# Patient Record
Sex: FEMALE | Race: WHITE | NOT HISPANIC OR LATINO | Employment: OTHER | URBAN - METROPOLITAN AREA
[De-identification: names, ages, dates, MRNs, and addresses within clinical notes are randomized per-mention and may not be internally consistent; named-entity substitution may affect disease eponyms.]

---

## 2018-07-23 ENCOUNTER — OFFICE VISIT (OUTPATIENT)
Dept: SPORTS MEDICINE | Facility: CLINIC | Age: 48
End: 2018-07-23

## 2018-07-23 VITALS
DIASTOLIC BLOOD PRESSURE: 84 MMHG | BODY MASS INDEX: 24.59 KG/M2 | SYSTOLIC BLOOD PRESSURE: 116 MMHG | WEIGHT: 147.6 LBS | HEIGHT: 65 IN

## 2018-07-23 DIAGNOSIS — M25.512 CHRONIC LEFT SHOULDER PAIN: ICD-10-CM

## 2018-07-23 DIAGNOSIS — M75.52 SUBACROMIAL BURSITIS OF LEFT SHOULDER JOINT: Primary | ICD-10-CM

## 2018-07-23 DIAGNOSIS — G89.29 CHRONIC LEFT SHOULDER PAIN: ICD-10-CM

## 2018-07-23 PROCEDURE — 99214 OFFICE O/P EST MOD 30 MIN: CPT | Performed by: FAMILY MEDICINE

## 2018-07-23 PROCEDURE — 20610 DRAIN/INJ JOINT/BURSA W/O US: CPT | Performed by: FAMILY MEDICINE

## 2018-07-23 RX ORDER — TRIAMCINOLONE ACETONIDE 40 MG/ML
80 INJECTION, SUSPENSION INTRA-ARTICULAR; INTRAMUSCULAR ONCE
Status: COMPLETED | OUTPATIENT
Start: 2018-07-23 | End: 2018-07-23

## 2018-07-23 RX ADMIN — TRIAMCINOLONE ACETONIDE 80 MG: 40 INJECTION, SUSPENSION INTRA-ARTICULAR; INTRAMUSCULAR at 12:37

## 2018-07-23 NOTE — PROGRESS NOTES
"Ileana is a 47 y.o. year old female    Chief Complaint   Patient presents with   • Left Shoulder - Pain, Follow-up     Prev x-ray UC, injured shoulder 2 month ago.        History of Present Illness  HPI     L shoulder pain: x2 mo. Fell in muddy hole while walking in neighborhood and landed directly on it. Went to outside UC, XR performed. Has tried Tylenol. Persistent pain, liza at night when rolls over on it. Pain with full ROM. RHD. Radiating down to mid bicep. Requests inj.    I have reviewed the patient's medical, family, and social history in detail and updated the computerized patient record.    Review of Systems   Constitutional: Negative for fever.   Musculoskeletal:        Per HPI   Skin: Negative for rash.   Neurological: Negative for weakness and numbness.   Psychiatric/Behavioral: Negative for sleep disturbance.   All other systems reviewed and are negative.      /84   Ht 165.1 cm (65\")   Wt 67 kg (147 lb 9.6 oz)   BMI 24.56 kg/m²      Physical Exam    Vital signs reviewed.   General: No acute distress.  Eyes: conjunctiva clear; pupils equally round and reactive  ENT: external ears and nose atraumatic; oropharynx clear  CV: no peripheral edema, 2+ distal pulses  Resp: normal respiratory effort, no use of accessory muscles  Skin: no rashes or wounds; normal turgor  Psych: mood and affect appropriate; recent and remote memory intact  Neuro: sensation to light touch intact    MSK Exam:  Ortho Exam    C-spine: full ROM; no tenderness; negative Spurling's maneuver b/l; 5/5 shoulder abduction,  strength; 2+ DTRs C5, C6 b/l  R shoulder: no tenderness or warmth; full ROM; negative Girard's; negative empty can; negative liftoff; - Woodard; - scarf  L shoulder: no tenderness or warmth; full ROM; negative Girard's; negative empty can; negative liftoff; + Woodard; + scarf      5/1/18 XR L shoulder 2 view - [facility]  IMPRESSION:     1. Normal left shoulder.    Shoulder Injection Procedure Note    Left " "shoulder subacromial bursa injection was discussed with the patient in detail, including indication, risks, benefits, and alternatives. Verbal consent was given for the procedure. Injection was performed by physician.  Injection site was identified by physical examination and cleaned with Betadine and alcohol swabs. Prior to needle insertion, ethyl chloride spray was used for surface anesthesia. Sterile technique was used.  A 25-gauge, 1.5\" needle was directed to the joint from a(n) posterior approach. Injectate was passed into the subacromial bursa without difficulty. The needle was removed and a simple bandage was applied. The procedure was tolerated well without difficulty.    Injection mixture:  1% lidocaine without epinephrine: 4 mL  40 mg/mL triamcinolone acetonide: 2 mL      Diagnoses and all orders for this visit:    Subacromial bursitis of left shoulder joint  -     Ambulatory Referral to Physical Therapy Evaluate and treat  -     triamcinolone acetonide (KENALOG-40) injection 80 mg; Inject 2 mL into the joint 1 (One) Time.    Chronic left shoulder pain  -     triamcinolone acetonide (KENALOG-40) injection 80 mg; Inject 2 mL into the joint 1 (One) Time.      Do not appreciate full RTC tear based on exam. Tolerated inj well. rec PT. F/up 6 wks.    EMR Dragon/Transcription disclaimer:    Much of this encounter note is an electronic transcription/translation of spoken language to printed text.  The electronic translation of spoken language may permit erroneous, or at times, nonsensical words or phrases to be inadvertently transcribed.  Although I have reviewed the note for such errors some may still exist.    "

## 2018-07-26 ENCOUNTER — TREATMENT (OUTPATIENT)
Dept: PHYSICAL THERAPY | Facility: CLINIC | Age: 48
End: 2018-07-26

## 2018-07-26 DIAGNOSIS — M75.52 SUBACROMIAL BURSITIS OF LEFT SHOULDER JOINT: Primary | ICD-10-CM

## 2018-07-26 DIAGNOSIS — M75.02 ADHESIVE CAPSULITIS OF LEFT SHOULDER: ICD-10-CM

## 2018-07-26 DIAGNOSIS — M25.512 LEFT SHOULDER PAIN, UNSPECIFIED CHRONICITY: ICD-10-CM

## 2018-07-26 PROCEDURE — 97014 ELECTRIC STIMULATION THERAPY: CPT | Performed by: PHYSICAL THERAPIST

## 2018-07-26 PROCEDURE — 97161 PT EVAL LOW COMPLEX 20 MIN: CPT | Performed by: PHYSICAL THERAPIST

## 2018-07-26 PROCEDURE — 97110 THERAPEUTIC EXERCISES: CPT | Performed by: PHYSICAL THERAPIST

## 2018-07-26 NOTE — PROGRESS NOTES
Physical Therapy Initial Evaluation and Plan of Care    Patient: Ileana Martin   : 1970  Diagnosis/ICD-10 Code:  Subacromial bursitis of left shoulder joint [M75.52]  Referring practitioner: Ciaran Ruiz *    Subjective Evaluation    History of Present Illness  Mechanism of injury: Fell in muddy hole while walking in neighborhood and landed directly on it in May. Couldn't move my arm so went to outside .     Got an injection on Monday and no significant improvement.      PMH - HNP cervical/lumbar - sees a chiropractor. Had recent eye surgery - retina    Quick DASH 25% perceived disability      Patient Occupation: self employed - owns rental property Pain  Current pain ratin  At best pain ratin  At worst pain ratin  Location: R anterior/lateral arm below shoulder joint.   Quality: radiating, burning and dull ache  Aggravating factors: sleeping, outstretched reach and overhead activity (behind the back)  Progression: worsening    Hand dominance: right    Diagnostic Tests  X-ray: normal    Treatments  Previous treatment: injection treatment           Objective       Tenderness     Left Shoulder   Tenderness in the subacromial bursa. No tenderness in the AC joint, acromion and clavicle.     Active Range of Motion   Left Shoulder   Flexion: 130 degrees with pain  Abduction: 96 (exquisite) degrees with pain  External rotation 45°: 27 degrees with pain  Internal rotation 45°: 62 degrees   Internal rotation BTB: Active internal rotation behind the back: L buttock. with pain    Right Shoulder   Internal rotation BTB: T8     Passive Range of Motion   Left Shoulder   Abduction: 96 degrees with pain    Joint Play   Left Shoulder  Hypomobile in the anterior capsule, posterior capsule and inferior capsule.    Strength/Myotome Testing     Left Shoulder     Planes of Motion   Flexion: 4+ (pain)   Abduction: 4+ (pain)   External rotation at 0°: 5   Internal rotation at 0°: 5     Tests     Left  Shoulder   Positive active compression ([place]), Hawkin's and passive horizontal adduction.   Negative drop arm and empty can.          Assessment & Plan     Assessment  Impairments: abnormal or restricted ROM, activity intolerance, impaired physical strength and pain with function  Assessment details:  Ileana Martin is a pleasant 47 y.o. female that presents presents with signs and symptoms consistent with post traumatic adhesive capsulitis/impingement/SA bursitis. She presents with jt hypomobility/decreased A/PROM, palp tenderness, + impingement/labral signs. Pt would benefit from skilled PT services in order to address listed impairments and increase tolerance to normal daily activities including ADLs, work and recreational activities.       Prognosis: good  Functional Limitations: carrying objects, lifting, sleeping, pushing, uncomfortable because of pain, reaching behind back and reaching overhead  Goals  Plan Goals: STG In 2 weeks  1. Pt to exhibit compliance/independence with HEP.  2. Pt to exhibit improved IR to at least L4 to allow for greater ease with behind the back movements.    3.  Improved sleep tolerance  4.  Pt to report improved tolerance to reaching activities    LTG In 4-6 weeks  1. L shoulder flex/abd 5/5 and non-painful to allow for push/pull and lifting activities.  2. Pain not > than 3/10 with ADLs  3. Pt no longer exhibiting + impingement signs to allow for tolerance to OH activities.  4. Quick DASH < 15%  5. AROM WNL and non painful to allow for full return to reaching activities as needed for personal care/ADLs      Plan  Therapy options: will be seen for skilled physical therapy services  Planned modality interventions: ultrasound, electrical stimulation/Russian stimulation and cryotherapy  Planned therapy interventions: manual therapy, joint mobilization, neuromuscular re-education, strengthening, stretching and home exercise program  Frequency: 2x week  Duration in weeks:  6  Treatment plan discussed with: patient        Manual Therapy:    10     mins  32436;  Therapeutic Exercise:    10     mins  36248;     Neuromuscular Jayson:    -    mins  12592;    Therapeutic Activity:     -     mins  44650;     Gait Training:      -     mins  96543;     Ultrasound:     -     mins  09587;    Electrical Stimulation:    15     mins  45165 ( );  Iontophoresis                 -     mins 46257      Timed Treatment:   20   mins   Total Treatment:     60   mins    PT SIGNATURE: JAMAL Heck License # [id number]  DATE TREATMENT INITIATED: 7/26/2018    Initial Certification  Certification Period: 10/24/2018  I certify that the therapy services are furnished while this patient is under my care.  The services outlined above are required by this patient, and will be reviewed every 90 days.     PHYSICIAN: Ciaran Ruiz Jr., DO      DATE:     Please sign and return via fax to [phone].. Thank you, [facility].

## 2018-07-26 NOTE — PATIENT INSTRUCTIONS
Access Code: [email or web address]   URL: [email or web address]   Date: 07/26/2018   Prepared by: Rosie Dubose     Exercises   Supine Shoulder External Rotation with Dowel - 10 reps - 1 sets - 5 hold - 2x daily   Supine Shoulder Flexion with Dowel AAROM - Palms Up - 10 reps - 1 sets - 5 hold - 2x daily   Seated Shoulder Inferior Glide - 4 reps - 1 sets - 20 hold - 2x daily   Circular Shoulder Pendulum with Table Support - 25 reps - 2 sets - 2x daily     Patient was educated on findings of evaluation, purpose of treatment, and goals for therapy.  Treatment options discussed and questions answered.  Patient was educated on exercises/self treatment/pain relief techniques.

## 2018-08-07 ENCOUNTER — TREATMENT (OUTPATIENT)
Dept: PHYSICAL THERAPY | Facility: CLINIC | Age: 48
End: 2018-08-07

## 2018-08-07 ENCOUNTER — OFFICE VISIT (OUTPATIENT)
Dept: ORTHOPEDIC SURGERY | Facility: CLINIC | Age: 48
End: 2018-08-07

## 2018-08-07 VITALS — HEIGHT: 65 IN | WEIGHT: 148.2 LBS | TEMPERATURE: 97.5 F | BODY MASS INDEX: 24.69 KG/M2

## 2018-08-07 DIAGNOSIS — M25.512 LEFT SHOULDER PAIN, UNSPECIFIED CHRONICITY: ICD-10-CM

## 2018-08-07 DIAGNOSIS — M75.52 SUBACROMIAL BURSITIS OF LEFT SHOULDER JOINT: Primary | ICD-10-CM

## 2018-08-07 DIAGNOSIS — M75.02 ADHESIVE CAPSULITIS OF LEFT SHOULDER: ICD-10-CM

## 2018-08-07 DIAGNOSIS — M75.02 ADHESIVE CAPSULITIS OF LEFT SHOULDER: Primary | ICD-10-CM

## 2018-08-07 PROCEDURE — 20610 DRAIN/INJ JOINT/BURSA W/O US: CPT | Performed by: ORTHOPAEDIC SURGERY

## 2018-08-07 PROCEDURE — 97110 THERAPEUTIC EXERCISES: CPT | Performed by: PHYSICAL THERAPIST

## 2018-08-07 PROCEDURE — 99204 OFFICE O/P NEW MOD 45 MIN: CPT | Performed by: ORTHOPAEDIC SURGERY

## 2018-08-07 RX ADMIN — LIDOCAINE HYDROCHLORIDE 4 ML: 10 INJECTION, SOLUTION INFILTRATION; PERINEURAL at 15:51

## 2018-08-07 RX ADMIN — METHYLPREDNISOLONE ACETATE 80 MG: 80 INJECTION, SUSPENSION INTRA-ARTICULAR; INTRALESIONAL; INTRAMUSCULAR; SOFT TISSUE at 15:51

## 2018-08-07 NOTE — PROGRESS NOTES
Physical Therapy Daily Progress Note  Visit: 2    Ileana aMrtin reports: I am feeling a little better in the (L) shdr.  The HEP is going well.     Subjective     Objective   See Exercise, Manual, and Modality Logs for complete treatment.       Assessment & Plan     Assessment  Assessment details: The pt demos (I) w/ HEP, but needed some VC's for shdr ER exercise.  Pt og the progression of exercises without issue.       Plan  Plan details: Progress ROM / strengthening /stabilization / functional activity as tolerated                   Manual Therapy:         mins  45618;  Therapeutic Exercise:    30     mins  32713;     Neuromuscular Jayson:        mins  14358;    Therapeutic Activity:          mins  80422;     Gait Training:           mins  70860;     Ultrasound:          mins  50818;    Electrical Stimulation:         mins  52600 ( );  Dry Needling          mins self-pay    Timed Treatment:   30   mins   Total Treatment:     30   mins    Dom Gong PT  [place] Lic. # [id number]  Physical Therapist

## 2018-08-07 NOTE — PROGRESS NOTES
Left  New Shoulder      Patient: Ileana Martin        YOB: 1970    Medical Record Number: [id number]        Chief Complaints: left shoulder pain  Chief Complaint   Patient presents with   • Left Shoulder - Establish Care, Pain           History of Present Illness: This is a  47 y.o. 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 Ruiz recently who did a cortisone injection the subacromial space she is also start some physical therapy his had no real improvement or symptoms of back it's a bit worse.  Symptoms are moderate intermittent stabbing aching past medical history is remarkable for anxiety          Allergies:   Allergies   Allergen Reactions   • Aspirin        Medications:   Home Medications:  Current Outpatient Prescriptions on File Prior to Visit   Medication Sig   • ALPRAZolam (XANAX) 0.25 MG tablet    • cyclobenzaprine (FLEXERIL) 10 MG tablet    • naproxen sodium (ALEVE) 220 MG tablet Take 220 mg by mouth 2 (two) times a day as needed for mild pain (1-3).   • nitrofurantoin, macrocrystal-monohydrate, (MACROBID) 100 MG capsule      No current facility-administered medications on file prior to visit.      Current Medications:  Scheduled Meds:  Continuous Infusions:  No current facility-administered medications for this visit.   PRN Meds:.    Past Medical History:   Diagnosis Date   • Anxiety    • Injury of back         Past Surgical History:   Procedure Laterality Date   •  SECTION          Social History     Occupational History   • Not on file.     Social History Main Topics   • Smoking status: Never Smoker   • Smokeless tobacco: Never Used   • Alcohol use No   • Drug use: No   • Sexual activity: Defer    Social History     Social History Narrative   • No narrative on  "file        Family History   Problem Relation Age of Onset   • Diabetes Mother    • Heart disease Mother    • Heart attack Mother    • Hypertension Mother    • No Known Problems Sister    • Heart attack Father    • Heart disease Father    • Hypertension Father    • Diabetes Father              Review of Systems: 14 point review of systems are remarkable only for the shoulder pain the remainder are negative per the patient    Review of Systems      Physical Exam: 47 y.o. female  General Appearance:    Alert, cooperative, in no acute distress                 Vitals:    08/07/18 1505   Temp: 97.5 °F (36.4 °C)   TempSrc: Temporal Artery    Weight: 67.2 kg (148 lb 3.2 oz)   Height: 165.1 cm (65\")      Patient is alert and read ×3 no acute distress appears her above-listed at height weight and age.  Affect is normal respiratory rate is normal unlabored. Heart rate regular rate rhythm, sclera, dentition and hearing are normal for the purpose of this exam.    Ortho Exam Physical exam of the left shoulder reveals no overlying skin changes no lymphedema no lymphadenopathy.  Patient has active flexion 160 with mild symptoms passively I can get her to about 170 abduction is similar external rotation is to 40 and internal rotation to her buttocks with  symptoms.  Patient has good rotator cuff strength 4+ over 5 with isometric strength testing with pain.  Patient has a positive impingement and a positive Woodard sign.  Patient has good cervical range of motion which is full and asymptomatic no radicular symptoms.  Patient has a normal elbow exam.  Good distal pulses are present      Large Joint Arthrocentesis  Date/Time: 8/7/2018 3:51 PM  Consent given by: patient  Site marked: site marked  Timeout: Immediately prior to procedure a time out was called to verify the correct patient, procedure, equipment, support staff and site/side marked as required   Supporting Documentation  Indications: pain   Procedure Details  Location: " shoulder - L glenohumeral  Preparation: Patient was prepped and draped in the usual sterile fashion  Needle size: 25 G  Approach: posterior  Medications administered: 80 mg methylPREDNISolone acetate 80 MG/ML; 4 mL lidocaine 1 %  Patient tolerance: patient tolerated the procedure well with no immediate complications                Radiology:   AP, Scapular Y and Axillary Lateral of the left shoulder were ordered/reviewed to evauate shoulder pain.  These were taken an outside facility I did review these at see no evidence of any acute bony pathology may be some mild acromioclavicular arthritis  Imaging Results (most recent)     None        Assessment/Plan:Left shoulder pain I think this is a frozen shoulder I suspect that looks like impingement before now think it is a frozen shoulder which I discussed with her in detail plan is proceed with a glenohumeral joint injection and I'll have her see physical therapy working on range of motion.  She gets her motion back and has persistent pain we'll get an MRI I will see her back in a month  Cortisone Injection. See procedure note.  Cortisone Injection for DIAGNOSTIC and THERAPUTIC purposes.

## 2018-08-08 ENCOUNTER — APPOINTMENT (OUTPATIENT)
Dept: WOMENS IMAGING | Facility: HOSPITAL | Age: 48
End: 2018-08-08

## 2018-08-08 PROCEDURE — 76641 ULTRASOUND BREAST COMPLETE: CPT | Performed by: RADIOLOGY

## 2018-08-08 PROCEDURE — 77065 DX MAMMO INCL CAD UNI: CPT | Performed by: RADIOLOGY

## 2018-08-08 PROCEDURE — MDREVSPNEW: Performed by: RADIOLOGY

## 2018-08-08 PROCEDURE — 77061 BREAST TOMOSYNTHESIS UNI: CPT | Performed by: RADIOLOGY

## 2018-08-08 PROCEDURE — G0279 TOMOSYNTHESIS, MAMMO: HCPCS | Performed by: RADIOLOGY

## 2018-08-09 RX ORDER — LIDOCAINE HYDROCHLORIDE 10 MG/ML
4 INJECTION, SOLUTION INFILTRATION; PERINEURAL
Status: COMPLETED | OUTPATIENT
Start: 2018-08-07 | End: 2018-08-07

## 2018-08-09 RX ORDER — METHYLPREDNISOLONE ACETATE 80 MG/ML
80 INJECTION, SUSPENSION INTRA-ARTICULAR; INTRALESIONAL; INTRAMUSCULAR; SOFT TISSUE
Status: COMPLETED | OUTPATIENT
Start: 2018-08-07 | End: 2018-08-07

## 2018-08-16 ENCOUNTER — TREATMENT (OUTPATIENT)
Dept: PHYSICAL THERAPY | Facility: CLINIC | Age: 48
End: 2018-08-16

## 2018-08-16 DIAGNOSIS — M75.52 SUBACROMIAL BURSITIS OF LEFT SHOULDER JOINT: Primary | ICD-10-CM

## 2018-08-16 DIAGNOSIS — M75.02 ADHESIVE CAPSULITIS OF LEFT SHOULDER: ICD-10-CM

## 2018-08-16 DIAGNOSIS — M25.512 LEFT SHOULDER PAIN, UNSPECIFIED CHRONICITY: ICD-10-CM

## 2018-08-16 PROCEDURE — 97110 THERAPEUTIC EXERCISES: CPT | Performed by: PHYSICAL THERAPIST

## 2018-08-16 NOTE — PROGRESS NOTES
Physical Therapy Daily Progress Note  Visit: 3    Ileana Martin reports: My (L) shdr is feeling good with no pain since my cortisone injection from Dr. Kiran.  She wanted me to focus on ROM exercises.      Subjective     Objective   See Exercise, Manual, and Modality Logs for complete treatment.       Assessment & Plan     Assessment  Assessment details: Performed minimal exercise today because of the week off and the inflammation after the last visit.  Will focus on ROM exercises to restore to PLOF.      Plan  Plan details: Progress ROM / strengthening /stabilization / functional activity as tolerated                   Manual Therapy:         mins  01773;  Therapeutic Exercise:    25     mins  68528;     Neuromuscular Jayson:        mins  51013;    Therapeutic Activity:          mins  37792;     Gait Training:           mins  36929;     Ultrasound:          mins  71804;    Electrical Stimulation:         mins  36667 ( );  Dry Needling          mins self-pay    Timed Treatment:   25   mins   Total Treatment:     37   mins    JAMAL Smyth Lic. # [id number]  Physical Therapist

## 2018-08-23 ENCOUNTER — TREATMENT (OUTPATIENT)
Dept: PHYSICAL THERAPY | Facility: CLINIC | Age: 48
End: 2018-08-23

## 2018-08-23 DIAGNOSIS — M75.02 ADHESIVE CAPSULITIS OF LEFT SHOULDER: ICD-10-CM

## 2018-08-23 DIAGNOSIS — M75.52 SUBACROMIAL BURSITIS OF LEFT SHOULDER JOINT: Primary | ICD-10-CM

## 2018-08-23 DIAGNOSIS — M25.512 LEFT SHOULDER PAIN, UNSPECIFIED CHRONICITY: ICD-10-CM

## 2018-08-23 PROCEDURE — 97110 THERAPEUTIC EXERCISES: CPT | Performed by: PHYSICAL THERAPIST

## 2018-08-23 NOTE — PROGRESS NOTES
Physical Therapy Daily Progress Note  Visit: 4    Ileana Martin reports: My (L) Shdr is feeling much better, I can take my shirt off now without pain.      Subjective     Objective   See Exercise, Manual, and Modality Logs for complete treatment.       Assessment & Plan     Assessment  Assessment details: Pt ivania improved functional ability to use the L shdr and decreased pain.      Plan  Plan details: Progress ROM / strengthening /stabilization / functional activity as tolerated            5     Manual Therapy:         mins  40152;  Therapeutic Exercise:    40     mins  48446;     Neuromuscular Jayson:        mins  95874;    Therapeutic Activity:          mins  32824;     Gait Training:           mins  01092;     Ultrasound:          mins  85195;    Electrical Stimulation:         mins  99779 ( );  Dry Needling          mins self-pay    Timed Treatment:   40   mins   Total Treatment:     40   mins    Dom Gong PT  [place] Lic. # [id number]  Physical Therapist

## 2018-09-06 ENCOUNTER — OFFICE VISIT (OUTPATIENT)
Dept: ORTHOPEDIC SURGERY | Facility: CLINIC | Age: 48
End: 2018-09-06

## 2018-09-06 VITALS — TEMPERATURE: 97.3 F | BODY MASS INDEX: 24.99 KG/M2 | WEIGHT: 150 LBS | HEIGHT: 65 IN

## 2018-09-06 DIAGNOSIS — M75.02 ADHESIVE CAPSULITIS OF LEFT SHOULDER: Primary | ICD-10-CM

## 2018-09-06 PROCEDURE — 99212 OFFICE O/P EST SF 10 MIN: CPT | Performed by: ORTHOPAEDIC SURGERY

## 2018-09-06 NOTE — PROGRESS NOTES
"Left Shoulder Follow Up      Patient: Ileana Martin        YOB: 1970            Chief Complaints: Left Shoulder pain  Chief Complaint   Patient presents with   • Left Shoulder - Follow-up, Pain         History of Present Illness:this patient is here follow-up of lmildly limited with regard to symp      Physical Exam: 47 y.o. female  General Appearance:    Alert, cooperative, in no acute distress                   Vitals:    09/06/18 0943   Temp: 97.3 °F (36.3 °C)   TempSrc: Temporal Artery    Weight: 68 kg (150 lb)   Height: 165.1 cm (65\")        Patient is alert and read ×3 no acute distress appears her above-listed at height weight and age.  Affect is normal respiratory rate is normal unlabored. Heart rate regular rate rhythm, sclera, dentition and hearing are normal for the purpose of this exam.      Ortho Exam    Physical exam of the left shoulder reveals no overlying skin changes no lymphedema no lymphadenopathy.  The patient can actively flex to 180, abduction is similar external rotation is to 50, internal rotation to the upper lumbar spine.  Rotator cuff strength is 4+ to 5 over 5 with isometric strength testing without symptoms.  The patient has good cervical range of motion full and asymptomatic no radicular symptoms and a normal elbow exam.  Patient has good distal pulses          Assessment/Plan:      Left shoulder adhesive capsulitis she is doing much better she can prn          "

## 2018-09-06 NOTE — PROGRESS NOTES
New Left Shoulder      Patient: Ileana Martin        YOB: 1970    Medical Record Number: [id number]        Chief Complaints: left shoulder pain    Chief Complaint   Patient presents with   • Left Shoulder - Follow-up, Pain         History of Present Illness: This is a        Allergies:   Allergies   Allergen Reactions   • Aspirin        Medications:   Home Medications:  Current Outpatient Prescriptions on File Prior to Visit   Medication Sig   • [DISCONTINUED] naproxen sodium (ALEVE) 220 MG tablet Take 220 mg by mouth 2 (two) times a day as needed for mild pain (1-3).   • [DISCONTINUED] nitrofurantoin, macrocrystal-monohydrate, (MACROBID) 100 MG capsule    • [DISCONTINUED] Acetaminophen (TYLENOL) 325 MG capsule Take  by mouth.   • [DISCONTINUED] ALPRAZolam (XANAX) 0.25 MG tablet    • [DISCONTINUED] cyclobenzaprine (FLEXERIL) 10 MG tablet      No current facility-administered medications on file prior to visit.      Current Medications:  Scheduled Meds:  Continuous Infusions:  No current facility-administered medications for this visit.   PRN Meds:.    Past Medical History:   Diagnosis Date   • Anxiety    • Injury of back         Past Surgical History:   Procedure Laterality Date   •  SECTION          Social History     Occupational History   • Not on file.     Social History Main Topics   • Smoking status: Never Smoker   • Smokeless tobacco: Never Used   • Alcohol use No   • Drug use: No   • Sexual activity: Defer    Social History     Social History Narrative   • No narrative on file        Family History   Problem Relation Age of Onset   • Diabetes Mother    • Heart disease Mother    • Heart attack Mother    • Hypertension Mother    • No Known Problems Sister    • Heart attack Father    • Heart disease Father    • Hypertension Father    • Diabetes Father              Review of Systems: ***    Review of Systems      Physical Exam: 47 y.o. female  General Appearance:    Alert,  "cooperative, in no acute distress                 Vitals:    09/06/18 0943   Temp: 97.3 °F (36.3 °C)   TempSrc: Temporal Artery    Weight: 68 kg (150 lb)   Height: 165.1 cm (65\")      Patient is alert and read ×3 no acute distress appears her above-listed at height weight and age.  Affect is normal respiratory rate is normal unlabored. Heart rate regular rate rhythm, sclera, dentition and hearing are normal for the purpose of this exam.    Ortho Exam    Procedures          Radiology:   AP, Scapular Y and Axillary Lateral of the *** shoulder were ordered/reviewed to evauate shoulder pain.  Imaging Results (most recent)     None        Assessment/Plan:  {georgerxplan:[id number]}  "

## 2019-03-08 ENCOUNTER — APPOINTMENT (OUTPATIENT)
Dept: WOMENS IMAGING | Facility: HOSPITAL | Age: 49
End: 2019-03-08

## 2019-03-08 PROCEDURE — MDREVIEWSP: Performed by: RADIOLOGY

## 2019-03-08 PROCEDURE — G0279 TOMOSYNTHESIS, MAMMO: HCPCS | Performed by: RADIOLOGY

## 2019-03-08 PROCEDURE — 77062 BREAST TOMOSYNTHESIS BI: CPT | Performed by: RADIOLOGY

## 2019-03-08 PROCEDURE — 77066 DX MAMMO INCL CAD BI: CPT | Performed by: RADIOLOGY

## 2019-11-18 ENCOUNTER — OFFICE VISIT (OUTPATIENT)
Dept: FAMILY MEDICINE CLINIC | Facility: CLINIC | Age: 49
End: 2019-11-18

## 2019-11-18 VITALS
OXYGEN SATURATION: 100 % | TEMPERATURE: 97.6 F | WEIGHT: 163 LBS | HEIGHT: 65 IN | RESPIRATION RATE: 16 BRPM | HEART RATE: 83 BPM | DIASTOLIC BLOOD PRESSURE: 72 MMHG | SYSTOLIC BLOOD PRESSURE: 114 MMHG | BODY MASS INDEX: 27.16 KG/M2

## 2019-11-18 DIAGNOSIS — Z86.39 HISTORY OF NON ANEMIC VITAMIN B12 DEFICIENCY: ICD-10-CM

## 2019-11-18 DIAGNOSIS — Z00.00 ANNUAL PHYSICAL EXAM: Primary | ICD-10-CM

## 2019-11-18 DIAGNOSIS — Z86.39 HISTORY OF VITAMIN D DEFICIENCY: ICD-10-CM

## 2019-11-18 PROCEDURE — 99386 PREV VISIT NEW AGE 40-64: CPT | Performed by: FAMILY MEDICINE

## 2019-11-18 NOTE — PROGRESS NOTES
Subjective   Ileana Martin is a 49 y.o. female.     49-year-old female is new patient he presents today for annual physical exam.    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    Diet/exercise: BMI 27.6.  Is tried more active in terms of exercise and is always trying to work on diet.  Think she does okay.   Exercise she is to modify sometimes due to history of back pain issues.    Social history: Never smoker no alcohol or drug use    Sexual history: Periods are regular, sexually active with .  No concern for STD screening today.    Sleep: Poor sleep hygiene where she watches TV in bed does not have a set bedtime and wake time.  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  Does see a therapist regularly. PHQ9 score of 4 and GAD7 of 7.  Not interested in any medication today.  Has tried guided meditation however she knows that she needs to have more consistently to get consistent benefit from it.    Flu vaccine: Refused  Tdap: Up-to-date  Pap smear: Due October 2021             No flowsheet data found.    The following portions of the patient's history were reviewed and updated as appropriate: allergies, current medications, past family history, past medical history, past social history, past surgical history and problem list.    Review of Systems   Constitutional: Negative for activity change, appetite change, chills and fever.   HENT: Negative for congestion, sinus pressure and sore throat.    Eyes: Negative for blurred vision and double vision.   Respiratory: Negative for cough, chest tightness and shortness of breath.    Cardiovascular: Negative for chest pain.   Gastrointestinal: Negative for abdominal pain, blood in stool, constipation and diarrhea.   Genitourinary: Negative for dysuria.   Neurological: Negative for  dizziness and headache.   Psychiatric/Behavioral: The patient is not nervous/anxious.        Objective   Physical Exam   Constitutional: She is oriented to person, place, and time. She appears well-developed and well-nourished.   HENT:   Head: Normocephalic and atraumatic.   Right Ear: Hearing, tympanic membrane, external ear and ear canal normal.   Left Ear: Hearing, tympanic membrane, external ear and ear canal normal.   Nose: Nose normal.   Mouth/Throat: Uvula is midline and mucous membranes are normal. No oropharyngeal exudate, posterior oropharyngeal edema, posterior oropharyngeal erythema or tonsillar abscesses.   Eyes: Conjunctivae and EOM are normal. Pupils are equal, round, and reactive to light.   Cardiovascular: Normal rate and regular rhythm.   Pulmonary/Chest: Effort normal and breath sounds normal. No respiratory distress. She has no decreased breath sounds.   Musculoskeletal: Normal range of motion.   Lymphadenopathy:        Right cervical: No superficial cervical adenopathy present.       Left cervical: No superficial cervical adenopathy present.   Neurological: She is alert and oriented to person, place, and time.   Psychiatric: She has a normal mood and affect. Her speech is normal.         No results found for: COLORU, CLARITYU, SPECGRAV, PHUR, LEUKOCYTESUR, NITRITE, PROTEINPOCUA, GLUCOSEUR, KETONESU, UROBILINOGEN, BILIRUBINUR, RBCUR      Assessment/Plan     Ileana was seen today for establish care and annual exam.    Diagnoses and all orders for this visit:    Annual physical exam  -     CBC & Differential  -     Comprehensive Metabolic Panel  -     Lipid Panel  -     Hemoglobin A1c  -     TSH Rfx On Abnormal To Free T4    History of vitamin D deficiency  -     Vitamin D 25 Hydroxy    History of non anemic vitamin B12 deficiency  -     Vitamin B12      Low glycemic index diet  Exercise 30 minutes most days of the week  Make sure you get results on any labs or tests we ordered today  We discussed  medications and how to take them as prescribed  Sleep 6-8 hours each night if possible  If you have not signed up for MyChart, please activate your code ASAP from your After Visit Summary today    LDL goal <100  LDL goal if heart disease <70  HDL goal >60  Triglyceride goal <150  BP goal =<130/80  Fasting glucose <100

## 2019-11-19 LAB
25(OH)D3+25(OH)D2 SERPL-MCNC: 19 NG/ML (ref 30–100)
ALBUMIN SERPL-MCNC: 4.4 G/DL (ref 3.5–5.5)
ALBUMIN/GLOB SERPL: 1.6 {RATIO} (ref 1.2–2.2)
ALP SERPL-CCNC: 87 IU/L (ref 39–117)
ALT SERPL-CCNC: 15 IU/L (ref 0–32)
AST SERPL-CCNC: 13 IU/L (ref 0–40)
BASOPHILS # BLD AUTO: 0 X10E3/UL (ref 0–0.2)
BASOPHILS NFR BLD AUTO: 0 %
BILIRUB SERPL-MCNC: 0.3 MG/DL (ref 0–1.2)
BUN SERPL-MCNC: 11 MG/DL (ref 6–24)
BUN/CREAT SERPL: 14 (ref 9–23)
CALCIUM SERPL-MCNC: 9.1 MG/DL (ref 8.7–10.2)
CHLORIDE SERPL-SCNC: 106 MMOL/L (ref 96–106)
CHOLEST SERPL-MCNC: 160 MG/DL (ref 100–199)
CO2 SERPL-SCNC: 21 MMOL/L (ref 20–29)
CREAT SERPL-MCNC: 0.78 MG/DL (ref 0.57–1)
EOSINOPHIL # BLD AUTO: 0.1 X10E3/UL (ref 0–0.4)
EOSINOPHIL NFR BLD AUTO: 1 %
ERYTHROCYTE [DISTWIDTH] IN BLOOD BY AUTOMATED COUNT: 15.3 % (ref 12.3–15.4)
GLOBULIN SER CALC-MCNC: 2.7 G/DL (ref 1.5–4.5)
GLUCOSE SERPL-MCNC: 97 MG/DL (ref 65–99)
HBA1C MFR BLD: 5.5 % (ref 4.8–5.6)
HCT VFR BLD AUTO: 39.2 % (ref 34–46.6)
HDLC SERPL-MCNC: 58 MG/DL
HGB BLD-MCNC: 12.7 G/DL (ref 11.1–15.9)
IMM GRANULOCYTES # BLD AUTO: 0 X10E3/UL (ref 0–0.1)
IMM GRANULOCYTES NFR BLD AUTO: 0 %
LDLC SERPL CALC-MCNC: 90 MG/DL (ref 0–99)
LYMPHOCYTES # BLD AUTO: 1.8 X10E3/UL (ref 0.7–3.1)
LYMPHOCYTES NFR BLD AUTO: 22 %
Lab: NORMAL
MCH RBC QN AUTO: 28 PG (ref 26.6–33)
MCHC RBC AUTO-ENTMCNC: 32.4 G/DL (ref 31.5–35.7)
MCV RBC AUTO: 86 FL (ref 79–97)
MONOCYTES # BLD AUTO: 0.7 X10E3/UL (ref 0.1–0.9)
MONOCYTES NFR BLD AUTO: 8 %
NEUTROPHILS # BLD AUTO: 5.7 X10E3/UL (ref 1.4–7)
NEUTROPHILS NFR BLD AUTO: 69 %
PLATELET # BLD AUTO: 376 X10E3/UL (ref 150–450)
POTASSIUM SERPL-SCNC: 4.4 MMOL/L (ref 3.5–5.2)
PROT SERPL-MCNC: 7.1 G/DL (ref 6–8.5)
RBC # BLD AUTO: 4.54 X10E6/UL (ref 3.77–5.28)
SODIUM SERPL-SCNC: 142 MMOL/L (ref 134–144)
TRIGL SERPL-MCNC: 61 MG/DL (ref 0–149)
TSH SERPL DL<=0.005 MIU/L-ACNC: 1.1 UIU/ML (ref 0.45–4.5)
VIT B12 SERPL-MCNC: 487 PG/ML (ref 232–1245)
VLDLC SERPL CALC-MCNC: 12 MG/DL (ref 5–40)
WBC # BLD AUTO: 8.2 X10E3/UL (ref 3.4–10.8)

## 2019-12-06 DIAGNOSIS — E55.9 VITAMIN D DEFICIENCY: Primary | ICD-10-CM

## 2020-03-11 ENCOUNTER — RESULTS ENCOUNTER (OUTPATIENT)
Dept: FAMILY MEDICINE CLINIC | Facility: CLINIC | Age: 50
End: 2020-03-11

## 2020-03-11 DIAGNOSIS — E55.9 VITAMIN D DEFICIENCY: ICD-10-CM

## 2020-10-29 ENCOUNTER — APPOINTMENT (OUTPATIENT)
Dept: WOMENS IMAGING | Facility: HOSPITAL | Age: 50
End: 2020-10-29

## 2020-10-29 PROCEDURE — 77063 BREAST TOMOSYNTHESIS BI: CPT | Performed by: RADIOLOGY

## 2020-10-29 PROCEDURE — 77067 SCR MAMMO BI INCL CAD: CPT | Performed by: RADIOLOGY

## 2021-03-05 ENCOUNTER — TELEPHONE (OUTPATIENT)
Dept: FAMILY MEDICINE CLINIC | Facility: CLINIC | Age: 51
End: 2021-03-05

## 2021-03-05 DIAGNOSIS — Z00.00 ROUTINE HEALTH MAINTENANCE: Primary | ICD-10-CM

## 2021-03-05 DIAGNOSIS — Z86.39 HISTORY OF NON ANEMIC VITAMIN B12 DEFICIENCY: ICD-10-CM

## 2021-03-05 DIAGNOSIS — E55.9 VITAMIN D DEFICIENCY: ICD-10-CM

## 2021-03-10 ENCOUNTER — TELEPHONE (OUTPATIENT)
Dept: FAMILY MEDICINE CLINIC | Facility: CLINIC | Age: 51
End: 2021-03-10

## 2021-03-10 NOTE — TELEPHONE ENCOUNTER
Caller: Ileana Martin    Relationship: Self    Best call back number: [phone] (H)    What orders are you requesting (i.e. lab or imaging): LAB ORDER    In what timeframe would the patient need to come in: END OF MAY     Where will you receive your lab/imaging services: - [facility]    Additional notes: PATIENT CALLED TO RESCHEDULE HER APPOINTMENT. DR. MONTOYA IS OUT TIL June AND PATIENT WILL BE NEEDING A LAB APPOINTMENT BEFORE HER APPOINTMENT.     THANKS

## 2021-04-06 ENCOUNTER — BULK ORDERING (OUTPATIENT)
Dept: CASE MANAGEMENT | Facility: OTHER | Age: 51
End: 2021-04-06

## 2021-04-06 DIAGNOSIS — Z23 IMMUNIZATION DUE: ICD-10-CM

## 2021-05-25 DIAGNOSIS — Z86.39 HISTORY OF NON ANEMIC VITAMIN B12 DEFICIENCY: Primary | ICD-10-CM

## 2021-05-25 DIAGNOSIS — E53.8 VITAMIN B12 DEFICIENCY: ICD-10-CM

## 2021-06-03 ENCOUNTER — OFFICE VISIT (OUTPATIENT)
Dept: FAMILY MEDICINE CLINIC | Facility: CLINIC | Age: 51
End: 2021-06-03

## 2021-06-03 VITALS
HEART RATE: 87 BPM | RESPIRATION RATE: 16 BRPM | BODY MASS INDEX: 27.66 KG/M2 | DIASTOLIC BLOOD PRESSURE: 92 MMHG | TEMPERATURE: 97.1 F | SYSTOLIC BLOOD PRESSURE: 140 MMHG | WEIGHT: 166 LBS | HEIGHT: 65 IN | OXYGEN SATURATION: 98 %

## 2021-06-03 DIAGNOSIS — Z00.00 ANNUAL PHYSICAL EXAM: Primary | ICD-10-CM

## 2021-06-03 DIAGNOSIS — Z12.11 SCREENING FOR COLON CANCER: ICD-10-CM

## 2021-06-03 PROCEDURE — 99396 PREV VISIT EST AGE 40-64: CPT | Performed by: FAMILY MEDICINE

## 2021-06-03 NOTE — PROGRESS NOTES
Subjective   Ileana Martin is a 50 y.o. female.     History of Present Illness     50-year-old female presents today for annual physical exam.    Diet/exercise: BMI 28.1; working on diet and exercise.    Social history: Never smoker no alcohol or drug use    Sexual history: Periods are regular sexually active with  no concern for STD screening today.    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  Increased rest these days; will work on guided meditation and getting back in with therapist.    Labs were done prior to appointment; CBC CMP A1c lipid panel TSH B12 vitamin D.  All labs essentially normal except B12 could be higher; recommended 1000 mcg a day B12 and recheck level in 3 months.    Pap smear: Up-to-date  Colon cancer screening: needs this  COVID-19 vaccination: refuses    PHQ-2/PHQ-9 Depression Screening 6/3/2021   Little interest or pleasure in doing things 0   Feeling down, depressed, or hopeless 0   Total Score 0       The following portions of the patient's history were reviewed and updated as appropriate: allergies, current medications, past family history, past medical history, past social history, past surgical history and problem list.    Review of Systems   Constitutional: Negative for activity change, appetite change, chills and fever.   HENT: Negative for congestion, sinus pressure and sore throat.    Eyes: Negative for blurred vision and double vision.   Respiratory: Negative for cough, chest tightness and shortness of breath.    Cardiovascular: Negative for chest pain.   Gastrointestinal: Negative for abdominal pain, blood in stool, constipation and diarrhea.   Genitourinary: Negative for dysuria.   Neurological: Negative for dizziness and headache.   Psychiatric/Behavioral: The patient is not nervous/anxious.        Objective   Physical Exam  Constitutional:        Appearance: She is well-developed.   HENT:      Head: Normocephalic and atraumatic.      Right Ear: External ear normal.      Left Ear: External ear normal.      Nose: Nose normal.   Eyes:      Conjunctiva/sclera: Conjunctivae normal.      Pupils: Pupils are equal, round, and reactive to light.   Cardiovascular:      Rate and Rhythm: Normal rate and regular rhythm.   Pulmonary:      Effort: Pulmonary effort is normal. No respiratory distress.      Breath sounds: Normal breath sounds. No stridor.   Abdominal:      General: Bowel sounds are normal. There is no distension.      Palpations: Abdomen is soft.      Tenderness: There is no abdominal tenderness.   Musculoskeletal:      Cervical back: Normal range of motion.   Skin:     General: Skin is warm.   Neurological:      Mental Status: She is alert and oriented to person, place, and time.   Psychiatric:         Behavior: Behavior normal.           No results found for: COLORU, CLARITYU, SPECGRAV, PHUR, LEUKOCYTESUR, NITRITE, PROTEINPOCUA, GLUCOSEUR, KETONESU, UROBILINOGEN, BILIRUBINUR, RBCUR      Assessment/Plan     Diagnoses and all orders for this visit:    1. Annual physical exam (Primary)    2. Screening for colon cancer  -     Cologuard - Stool, Per Rectum; Future        Low glycemic index diet  Exercise 30 minutes most days of the week  Make sure you get results on any labs or tests we ordered today  We discussed medications and how to take them as prescribed  Sleep 6-8 hours each night if possible  If you have not signed up for [employer], please activate your code ASAP from your After Visit Summary today    LDL goal <100  LDL goal if heart disease <70  HDL goal >60  Triglyceride goal <150  BP goal =<130/80  Fasting glucose <100

## 2021-06-03 NOTE — PATIENT INSTRUCTIONS

## 2022-01-05 ENCOUNTER — APPOINTMENT (OUTPATIENT)
Dept: WOMENS IMAGING | Facility: HOSPITAL | Age: 52
End: 2022-01-05

## 2022-01-05 PROCEDURE — 77063 BREAST TOMOSYNTHESIS BI: CPT | Performed by: RADIOLOGY

## 2022-01-05 PROCEDURE — 77067 SCR MAMMO BI INCL CAD: CPT | Performed by: RADIOLOGY

## 2023-04-10 ENCOUNTER — APPOINTMENT (OUTPATIENT)
Dept: WOMENS IMAGING | Facility: HOSPITAL | Age: 53
End: 2023-04-10
Payer: COMMERCIAL

## 2023-04-10 PROCEDURE — 77067 SCR MAMMO BI INCL CAD: CPT | Performed by: RADIOLOGY

## 2023-04-10 PROCEDURE — 77063 BREAST TOMOSYNTHESIS BI: CPT | Performed by: RADIOLOGY

## 2024-05-16 ENCOUNTER — APPOINTMENT (OUTPATIENT)
Dept: WOMENS IMAGING | Facility: HOSPITAL | Age: 54
End: 2024-05-16
Payer: COMMERCIAL

## 2024-05-16 PROCEDURE — 77063 BREAST TOMOSYNTHESIS BI: CPT | Performed by: RADIOLOGY

## 2024-05-16 PROCEDURE — 77067 SCR MAMMO BI INCL CAD: CPT | Performed by: RADIOLOGY

## 2024-12-12 ENCOUNTER — HOSPITAL ENCOUNTER (EMERGENCY)
Facility: HOSPITAL | Age: 54
Discharge: HOME | End: 2024-12-12
Attending: EMERGENCY MEDICINE
Payer: COMMERCIAL

## 2024-12-12 ENCOUNTER — APPOINTMENT (OUTPATIENT)
Dept: GENERAL RADIOLOGY | Facility: HOSPITAL | Age: 54
End: 2024-12-12
Payer: COMMERCIAL

## 2024-12-12 ENCOUNTER — APPOINTMENT (OUTPATIENT)
Dept: CT IMAGING | Facility: HOSPITAL | Age: 54
End: 2024-12-12
Payer: COMMERCIAL

## 2024-12-12 VITALS
BODY MASS INDEX: 24.75 KG/M2 | SYSTOLIC BLOOD PRESSURE: 180 MMHG | WEIGHT: 145 LBS | DIASTOLIC BLOOD PRESSURE: 77 MMHG | TEMPERATURE: 97.3 F | OXYGEN SATURATION: 99 % | RESPIRATION RATE: 15 BRPM | HEIGHT: 64 IN | HEART RATE: 91 BPM

## 2024-12-12 DIAGNOSIS — S46.911A STRAIN OF RIGHT SHOULDER, INITIAL ENCOUNTER: ICD-10-CM
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
## 2024-12-12 DIAGNOSIS — S20.211A RIB CONTUSION, RIGHT, INITIAL ENCOUNTER: ICD-10-CM

## 2024-12-12 PROCEDURE — 70450 CT HEAD/BRAIN W/O DYE: CPT

## 2024-12-12 PROCEDURE — 73030 X-RAY EXAM OF SHOULDER: CPT

## 2024-12-12 PROCEDURE — 96372 THER/PROPH/DIAG INJ SC/IM: CPT

## 2024-12-12 PROCEDURE — 71101 X-RAY EXAM UNILAT RIBS/CHEST: CPT

## 2024-12-12 PROCEDURE — 72125 CT NECK SPINE W/O DYE: CPT

## 2024-12-12 PROCEDURE — 99284 EMERGENCY DEPT VISIT MOD MDM: CPT

## 2024-12-12 PROCEDURE — 25010000002 KETOROLAC TROMETHAMINE PER 15 MG: Performed by: PHYSICIAN ASSISTANT

## 2024-12-12 RX ORDER — LIDOCAINE 50 MG/G
1 PATCH TOPICAL EVERY 24 HOURS
Qty: 7 PATCH | Refills: 0 | Status: SHIPPED | OUTPATIENT
Start: 2024-12-12 | End: 2024-12-19

## 2024-12-12 RX ORDER — METHOCARBAMOL 500 MG/1
500 TABLET, FILM COATED ORAL 4 TIMES DAILY PRN
Qty: 15 TABLET | Refills: 0 | Status: SHIPPED | OUTPATIENT
Start: 2024-12-12 | End: 2024-12-17

## 2024-12-12 RX ORDER — PREDNISONE 10 MG/1
10 TABLET ORAL 2 TIMES DAILY
Qty: 10 TABLET | Refills: 0 | Status: SHIPPED | OUTPATIENT
Start: 2024-12-12 | End: 2024-12-17

## 2024-12-12 RX ORDER — KETOROLAC TROMETHAMINE 30 MG/ML
30 INJECTION, SOLUTION INTRAMUSCULAR; INTRAVENOUS ONCE
Status: COMPLETED | OUTPATIENT
Start: 2024-12-12 | End: 2024-12-12

## 2024-12-12 RX ADMIN — KETOROLAC TROMETHAMINE 30 MG: 30 INJECTION, SOLUTION INTRAMUSCULAR at 13:31

## 2024-12-12 NOTE — DISCHARGE INSTRUCTIONS
Please take the medications as prescribed.  I advise you call Dr. Emanuel Seals, orthopedics today or tomorrow to schedule a follow-up appointment regarding your right shoulder.  Light activity for the next 2 to 3 days.  Use the incentive spirometer to help initiate take deep breaths to prevent pneumonia.

## 2024-12-13 NOTE — FSED PROVIDER NOTE
Subjective   History of Present Illness    Patient reports that she was at home walking down a flight of stairs when she missed a step and fell down approximately 10 steps.  She hit her head on carpeted floor and landed on her right side.  She is complaining of a mild headache, right shoulder, right arm, and right rib pain.  Denies any loss of consciousness.    Review of Systems   Constitutional:  Negative for activity change and appetite change.   Eyes:  Negative for pain.   Respiratory:  Negative for shortness of breath.    Gastrointestinal:  Negative for nausea and vomiting.   Musculoskeletal:  Positive for arthralgias.   Skin:  Negative for color change.   Neurological:  Negative for dizziness.   All other systems reviewed and are negative.      Past Medical History:   Diagnosis Date    Anxiety     Injury of back        Allergies   Allergen Reactions    Aspirin Hives       Past Surgical History:   Procedure Laterality Date     SECTION         Family History   Problem Relation Age of Onset    Diabetes Mother     Heart disease Mother     Heart attack Mother     Hypertension Mother     Anxiety disorder Mother     Asthma Sister     Heart attack Father     Heart disease Father     Hypertension Father     Diabetes Father        Social History     Socioeconomic History    Marital status:    Tobacco Use    Smoking status: Never    Smokeless tobacco: Never   Substance and Sexual Activity    Alcohol use: No    Drug use: No    Sexual activity: Defer           Objective   Physical Exam  Vitals and nursing note reviewed.   Constitutional:       Appearance: Normal appearance. She is normal weight.   HENT:      Head: Normocephalic and atraumatic.      Nose: Nose normal.      Mouth/Throat:      Mouth: Mucous membranes are moist.   Eyes:      Pupils: Pupils are equal, round, and reactive to light.   Cardiovascular:      Rate and Rhythm: Normal rate and regular rhythm.      Pulses: Normal pulses.      Heart sounds:  Normal heart sounds.   Pulmonary:      Effort: Pulmonary effort is normal.      Breath sounds: Normal breath sounds.   Musculoskeletal:         General: Normal range of motion.      Cervical back: Normal range of motion.      Right lower leg: No edema.      Left lower leg: No edema.      Comments: Right lateral rib: Minimal tenderness palpation    Right shoulder: Minimal tenderness palpation to the anterior region; minimal range of motion secondary to pain   Skin:     General: Skin is warm.   Neurological:      General: No focal deficit present.      Mental Status: She is alert.   Psychiatric:         Behavior: Behavior is cooperative.         Procedures           ED Course                                           Medical Decision Making      Final diagnoses:   Fall, initial encounter   Rib contusion, right, initial encounter   Strain of right shoulder, initial encounter       ED Disposition  ED Disposition       ED Disposition   Discharge    Condition   Stable    Comment   --               Emanuel Seals MD  [address] [facility] [address]  [place] [address]  [phone]    Call       [facility] - [place] [address]  [phone]             Medication List        New Prescriptions      lidocaine 5 %  Commonly known as: LIDODERM  Place 1 patch on the skin as directed by provider Daily for 7 days. Remove & Discard patch within 12 hours or as directed by MD     methocarbamol 500 MG tablet  Commonly known as: ROBAXIN  Take 1 tablet by mouth 4 (Four) Times a Day As Needed for Muscle Spasms for up to 5 days.     predniSONE 10 MG tablet  Commonly known as: DELTASONE  Take 1 tablet by mouth 2 (Two) Times a Day for 5 days.               Where to Get Your Medications        These medications were sent to [facility] [id number] - [place], [place] - [address] AT [facility] - [phone]  - [phone] [place] [address], [facility] [address]      Phone: [phone]    lidocaine 5 %  methocarbamol 500 MG tablet  predniSONE 10 MG tablet

## 2025-08-15 ENCOUNTER — APPOINTMENT (OUTPATIENT)
Dept: WOMENS IMAGING | Facility: HOSPITAL | Age: 55
End: 2025-08-15
Payer: COMMERCIAL

## 2025-08-15 PROCEDURE — 77063 BREAST TOMOSYNTHESIS BI: CPT | Performed by: RADIOLOGY

## 2025-08-15 PROCEDURE — 77067 SCR MAMMO BI INCL CAD: CPT | Performed by: RADIOLOGY
